# Patient Record
Sex: MALE | Race: WHITE | NOT HISPANIC OR LATINO | Employment: PART TIME | URBAN - METROPOLITAN AREA
[De-identification: names, ages, dates, MRNs, and addresses within clinical notes are randomized per-mention and may not be internally consistent; named-entity substitution may affect disease eponyms.]

---

## 2021-04-12 ENCOUNTER — IMMUNIZATION (OUTPATIENT)
Dept: PRIMARY CARE CLINIC | Facility: CLINIC | Age: 31
End: 2021-04-12
Payer: COMMERCIAL

## 2021-04-12 DIAGNOSIS — Z23 NEED FOR VACCINATION: Primary | ICD-10-CM

## 2021-04-12 PROCEDURE — 91303 PR SARSCOV2 VAC AD26 .5ML IM: CPT | Mod: S$GLB,,, | Performed by: INTERNAL MEDICINE

## 2021-04-12 PROCEDURE — 0031A PR IMMUNIZ ADMIN, SARS-COV-2 COVID-19 VACC, 5X10VP/0.5ML: ICD-10-PCS | Mod: CV19,S$GLB,, | Performed by: INTERNAL MEDICINE

## 2021-04-12 PROCEDURE — 91303 PR SARSCOV2 VAC AD26 .5ML IM: ICD-10-PCS | Mod: S$GLB,,, | Performed by: INTERNAL MEDICINE

## 2021-04-12 PROCEDURE — 0031A PR IMMUNIZ ADMIN, SARS-COV-2 COVID-19 VACC, 5X10VP/0.5ML: CPT | Mod: CV19,S$GLB,, | Performed by: INTERNAL MEDICINE

## 2024-12-18 ENCOUNTER — OFFICE VISIT (OUTPATIENT)
Dept: PRIMARY CARE CLINIC | Facility: CLINIC | Age: 34
End: 2024-12-18
Payer: COMMERCIAL

## 2024-12-18 VITALS
DIASTOLIC BLOOD PRESSURE: 74 MMHG | SYSTOLIC BLOOD PRESSURE: 132 MMHG | HEART RATE: 81 BPM | BODY MASS INDEX: 33.55 KG/M2 | OXYGEN SATURATION: 98 % | HEIGHT: 71 IN | RESPIRATION RATE: 18 BRPM | WEIGHT: 239.63 LBS

## 2024-12-18 DIAGNOSIS — E66.811 OBESITY (BMI 30.0-34.9): ICD-10-CM

## 2024-12-18 DIAGNOSIS — Z11.59 NEED FOR HEPATITIS C SCREENING TEST: ICD-10-CM

## 2024-12-18 DIAGNOSIS — Z11.4 ENCOUNTER FOR SCREENING FOR HIV: ICD-10-CM

## 2024-12-18 DIAGNOSIS — Z76.89 ENCOUNTER TO ESTABLISH CARE: ICD-10-CM

## 2024-12-18 DIAGNOSIS — Z79.899 OTHER LONG TERM (CURRENT) DRUG THERAPY: ICD-10-CM

## 2024-12-18 DIAGNOSIS — F41.1 GAD (GENERALIZED ANXIETY DISORDER): ICD-10-CM

## 2024-12-18 DIAGNOSIS — Z00.00 ANNUAL PHYSICAL EXAM: Primary | ICD-10-CM

## 2024-12-18 PROCEDURE — 99999 PR PBB SHADOW E&M-NEW PATIENT-LVL IV: CPT | Mod: PBBFAC,,, | Performed by: STUDENT IN AN ORGANIZED HEALTH CARE EDUCATION/TRAINING PROGRAM

## 2024-12-18 RX ORDER — PROPRANOLOL HYDROCHLORIDE 10 MG/1
10-40 TABLET ORAL DAILY PRN
Qty: 40 TABLET | Refills: 5 | Status: SHIPPED | OUTPATIENT
Start: 2024-12-18

## 2024-12-18 RX ORDER — DULOXETIN HYDROCHLORIDE 20 MG/1
20 CAPSULE, DELAYED RELEASE ORAL DAILY PRN
COMMUNITY

## 2024-12-18 NOTE — PATIENT INSTRUCTIONS
"  Assessment & Plan    IMPRESSION:  - Assessed patient's use of duloxetine (Cymbalta) for anxiety, noting atypical PRN usage  - Considered propranolol as alternative for managing public speaking anxiety, potentially offering fewer side effects and no weight gain concerns  - Evaluated patient's weight and BMI, acknowledging recent weight gain  - Reviewed family history of hypertension and heart issues  - Assessed need for routine health screenings based on patient's age and risk factors    LIFESTYLE AND WEIGHT MANAGEMENT:  - Discussed importance of timing for 4-aszv-v-day approach in weight management.  - Provided information on the role of diet vs. exercise in weight loss (80-90% diet, 10-20% exercise).  - Recommend book "Life in the Fasting [place]" by Dr. Tejinder Qiu for information on fasting approaches.  - Rene to continue current approach of fasting and 1 meal a day for weight management.  - Recommend considering earlier timing for the 1 meal to optimize weight loss efforts.  - Rene to maintain exercise routine, recognizing its benefits beyond calorie burning.    ANXIETY MANAGEMENT:  - Explained differences between SNRI (duloxetine) and beta-blocker (propranolol) mechanisms for anxiety management.  - Started propranolol 10-40 mg daily PRN for anxiety or public speaking.  - Continued duloxetine (Cymbalta) 20 mg PRN for anxiety.  - Contact office if anxiety worsens or if patient feels current management is inadequate.    GENERAL ADULT MEDICAL EXAMINATION:  - Ordered CBC, CMP, lipid panel, and thyroid function tests as routine labs.  - Ordered Hepatitis C and HIV screening.  - Will communicate any concerning lab results via phone call; otherwise, normal results will be sent through [facility].    FOLLOW-UP:  - Follow up in 1 year for annual visit.   "

## 2024-12-18 NOTE — PROGRESS NOTES
"  Assessment:       1. Annual physical exam    2. AIXA (generalized anxiety disorder)    3. Obesity (BMI 30.0-34.9)    4. Encounter to establish care    5. Need for hepatitis C screening test    6. Encounter for screening for HIV    7. Other long term (current) drug therapy           Plan:     Assessment & Plan    IMPRESSION:  - Assessed patient's use of duloxetine (Cymbalta) for anxiety, noting atypical PRN usage  - Considered propranolol as alternative for managing public speaking anxiety, potentially offering fewer side effects and no weight gain concerns  - Evaluated patient's weight and BMI, acknowledging recent weight gain  - Reviewed family history of hypertension and heart issues  - Assessed need for routine health screenings based on patient's age and risk factors    LIFESTYLE AND WEIGHT MANAGEMENT:  - Discussed importance of timing for 6-cehd-y-day approach in weight management.  - Provided information on the role of diet vs. exercise in weight loss (80-90% diet, 10-20% exercise).  - Recommend book "Life in the Fasting [place]" by Dr. Tejinder Qiu for information on fasting approaches.  - Rene to continue current approach of fasting and 1 meal a day for weight management.  - Recommend considering earlier timing for the 1 meal to optimize weight loss efforts.  - Rene to maintain exercise routine, recognizing its benefits beyond calorie burning.    ANXIETY MANAGEMENT:  - Explained differences between SNRI (duloxetine) and beta-blocker (propranolol) mechanisms for anxiety management.  - Started propranolol 10-40 mg daily PRN for anxiety or public speaking.  - Continued duloxetine (Cymbalta) 20 mg PRN for anxiety.  - Contact office if anxiety worsens or if patient feels current management is inadequate.    GENERAL ADULT MEDICAL EXAMINATION:  - Ordered CBC, CMP, lipid panel, and thyroid function tests as routine labs.  - Ordered Hepatitis C and HIV screening.  - Will communicate any concerning lab results via " phone call; otherwise, normal results will be sent through [facility] [employer].    FOLLOW-UP:  - Follow up in 1 year for annual visit.             Annual physical exam  -     CBC Auto Differential; Future; Expected date: 12/18/2024  -     Comprehensive Metabolic Panel; Future; Expected date: 12/18/2024  -     Lipid Panel; Future; Expected date: 12/18/2024  -     TSH; Future; Expected date: 12/18/2024  -     T4, Free; Future; Expected date: 12/18/2024    AIXA (generalized anxiety disorder)  -     propranoloL (INDERAL) 10 MG tablet; Take 1-4 tablets (10-40 mg total) by mouth daily as needed (anxiety, public speaking).  Dispense: 40 tablet; Refill: 5  -     CBC Auto Differential; Future; Expected date: 12/18/2024  -     Comprehensive Metabolic Panel; Future; Expected date: 12/18/2024  -     Lipid Panel; Future; Expected date: 12/18/2024  -     TSH; Future; Expected date: 12/18/2024  -     T4, Free; Future; Expected date: 12/18/2024    Obesity (BMI 30.0-34.9)    Encounter to establish care    Need for hepatitis C screening test  -     Hepatitis C Antibody; Future; Expected date: 12/18/2024    Encounter for screening for HIV  -     HIV 1/2 Ag/Ab (4th Gen); Future; Expected date: 12/18/2024    Other long term (current) drug therapy  -     CBC Auto Differential; Future; Expected date: 12/18/2024  -     Comprehensive Metabolic Panel; Future; Expected date: 12/18/2024  -     Lipid Panel; Future; Expected date: 12/18/2024  -     TSH; Future; Expected date: 12/18/2024  -     T4, Free; Future; Expected date: 12/18/2024                This note was generated with the assistance of ambient listening technology. Verbal consent was obtained by the patient and accompanying visitor(s) for the recording of patient appointment to facilitate this note. I attest to having reviewed and edited the generated note for accuracy, though some syntax or spelling errors may persist. Please contact the author of this note for any  clarification.      Subjective:           Patient ID: Rene Matamoros   Age:  34 y.o.  Sex: male     Chief Complaint:   Establish Care      History of Present Illness:    Rene Matamoros is a 34 y.o. male who presents today with a chief complaint of Establish Care  .        History of Present Illness    CHIEF COMPLAINT:  Rene presents today for a general physical exam.    WEIGHT MANAGEMENT:  He reports a 50-pound weight gain over the past three years since stopping exercise after having children. He has previously managed weight through cardio, running, and intermittent fasting. He is currently attempting weight loss through fasting and eating one meal a day.    ANXIETY:  He experiences episodes characterized by racing heart and sweating. He takes Cymbalta (Duloxetine) 20 mg as needed, prescribed by Dr. Deegan Dancero in [place], reporting lethargy with daily use.    RESPIRATORY:  He denies regular wheezing, chronic cough, or chest tightness.    SOCIAL HISTORY:  He is  with two children. He drinks approximately four beers weekly in work-related social settings. He denies smoking and recreational drug use.    FAMILY HISTORY:  He has a family history of hypertension on maternal side. Grandfather and uncle have undergone heart surgery.    PAST MEDICAL HISTORY:  He has a history of third-degree left ankle sprain from high school. He denies any surgical history.      ROS:  General: +fatigue, +weight gain  Cardiovascular: +palpitations, -chest tightness  Respiratory: -cough, -wheezing  Endocrine: +excessive sweating           Review of Systems   Constitutional: Negative.  Negative for fatigue and fever.   HENT: Negative.  Negative for congestion, rhinorrhea, sinus pressure, sinus pain and sore throat.    Eyes: Negative.    Respiratory: Negative.  Negative for cough, chest tightness, shortness of breath and wheezing.    Cardiovascular:  Positive for palpitations (just if anxious). Negative for chest pain and leg  "swelling.   Gastrointestinal: Negative.  Negative for abdominal distention, constipation, diarrhea, nausea and vomiting.   Endocrine: Negative.    Genitourinary: Negative.  Negative for difficulty urinating, frequency and urgency.   Musculoskeletal: Negative.  Negative for arthralgias and gait problem.   Skin: Negative.  Negative for rash.   Allergic/Immunologic: Negative for food allergies.   Neurological:  Negative for dizziness, weakness, light-headedness and headaches.   Psychiatric/Behavioral:  Negative for sleep disturbance. The patient is nervous/anxious.            Objective:        Vitals:    12/18/24 1325   BP: 132/74   BP Location: Right arm   Patient Position: Sitting   Pulse: 81   Resp: 18   SpO2: 98%   Weight: 108.7 kg (239 lb 10.2 oz)   Height: 5' 11" (1.803 m)       Body mass index is 33.42 kg/m².      Physical Exam  Vitals reviewed.   Constitutional:       General: He is not in acute distress.     Appearance: Normal appearance. He is not ill-appearing.      Comments: As per BMI.   HENT:      Head: Normocephalic and atraumatic.      Right Ear: Tympanic membrane and external ear normal.      Left Ear: Tympanic membrane and external ear normal.      Nose: Nose normal.   Eyes:      Extraocular Movements: Extraocular movements intact.      Conjunctiva/sclera: Conjunctivae normal.   Cardiovascular:      Rate and Rhythm: Normal rate and regular rhythm.      Pulses: Normal pulses.      Heart sounds: No murmur heard.  Pulmonary:      Effort: Pulmonary effort is normal. No respiratory distress.      Breath sounds: No wheezing.   Abdominal:      General: Abdomen is flat.      Tenderness: There is no right CVA tenderness or left CVA tenderness.   Musculoskeletal:         General: No swelling or deformity.      Cervical back: Normal range of motion.      Right lower leg: No edema.      Left lower leg: No edema.   Lymphadenopathy:      Cervical: No cervical adenopathy.   Skin:     Capillary Refill: Capillary " "refill takes less than 2 seconds.      Findings: No lesion.   Neurological:      General: No focal deficit present.      Mental Status: He is alert and oriented to person, place, and time.      Gait: Gait normal.   Psychiatric:         Mood and Affect: Mood normal.         Physical Exam    Vitals: Heart rate: 81.  Cardiovascular: Good pulses.               No past medical history on file.    No results found for: "NA", "K", "CL", "CO2", "BUN", "CREATININE", "GLUCOSE", "ANIONGAP"  No results found for: "HGBA1C"  No results found for: "BNP", "BNPTRIAGEBLO"    No results found for: "WBC", "HGB", "HCT", "PLT", "GRAN"  No results found for: "CHOL", "HDL", "LDLCALC", "TRIG"     Outpatient Encounter Medications as of 12/18/2024   Medication Sig Dispense Refill    DULoxetine (CYMBALTA) 20 MG capsule Take 20 mg by mouth daily as needed (anxiety, public speaking).      propranoloL (INDERAL) 10 MG tablet Take 1-4 tablets (10-40 mg total) by mouth daily as needed (anxiety, public speaking). 40 tablet 5     No facility-administered encounter medications on file as of 12/18/2024.            "